# Patient Record
Sex: MALE | Race: WHITE
[De-identification: names, ages, dates, MRNs, and addresses within clinical notes are randomized per-mention and may not be internally consistent; named-entity substitution may affect disease eponyms.]

---

## 2019-01-07 ENCOUNTER — HOSPITAL ENCOUNTER (OUTPATIENT)
Dept: HOSPITAL 92 - SCSCT | Age: 54
Discharge: HOME | End: 2019-01-07
Attending: INTERNAL MEDICINE
Payer: COMMERCIAL

## 2019-01-07 DIAGNOSIS — M51.36: ICD-10-CM

## 2019-01-07 DIAGNOSIS — M53.3: Primary | ICD-10-CM

## 2019-01-07 PROCEDURE — 72192 CT PELVIS W/O DYE: CPT

## 2019-01-07 NOTE — CT
CT PELVIS WITHOUT CONTRAST:

 

INDICATIONS:

Pain within the tailbone region for two months.

 

COMPARISON:

Sacrococcygeal radiographs dated 10/17/2018.

 

FINDINGS:

No displaced sacral or coccygeal fracture is demonstrated.  The sacral neural foramina appear widely 
patent.  No lymphadenopathy is evident.  There are mild vascular calcifications involving the distal 
abdominal aorta and common iliac arteries.  There is a normal appendix in the right lower quadrant.  
The bladder is unremarkable.  The prostate measures 3.7 cm, which is within normal limits.  there is 
moderate disk degenerative disease at L4-L5 with vacuum disk phenomenon.

 

IMPRESSION:

1.  No acute fracture or subluxation demonstrated.  The perirectal region appears within normal limit
s.  Ischiorectal fat appears within normal limits.

 

2.  Disk degenerative disease at L4-L5.

 

POS: GRABIEL